# Patient Record
Sex: MALE | Race: WHITE | Employment: OTHER | URBAN - NONMETROPOLITAN AREA
[De-identification: names, ages, dates, MRNs, and addresses within clinical notes are randomized per-mention and may not be internally consistent; named-entity substitution may affect disease eponyms.]

---

## 2017-05-09 ENCOUNTER — OFFICE VISIT (OUTPATIENT)
Dept: FAMILY MEDICINE | Facility: CLINIC | Age: 57
End: 2017-05-09
Payer: COMMERCIAL

## 2017-05-09 VITALS
SYSTOLIC BLOOD PRESSURE: 160 MMHG | DIASTOLIC BLOOD PRESSURE: 108 MMHG | HEIGHT: 72 IN | WEIGHT: 217 LBS | RESPIRATION RATE: 18 BRPM | HEART RATE: 78 BPM | BODY MASS INDEX: 29.39 KG/M2 | TEMPERATURE: 97.6 F

## 2017-05-09 DIAGNOSIS — Z72.0 TOBACCO ABUSE: ICD-10-CM

## 2017-05-09 DIAGNOSIS — R53.83 FATIGUE, UNSPECIFIED TYPE: ICD-10-CM

## 2017-05-09 DIAGNOSIS — I10 BENIGN ESSENTIAL HYPERTENSION: Primary | ICD-10-CM

## 2017-05-09 DIAGNOSIS — E66.3 OVERWEIGHT (BMI 25.0-29.9): ICD-10-CM

## 2017-05-09 PROCEDURE — 99214 OFFICE O/P EST MOD 30 MIN: CPT | Performed by: FAMILY MEDICINE

## 2017-05-09 RX ORDER — LISINOPRIL 10 MG/1
10 TABLET ORAL DAILY
Qty: 90 TABLET | Refills: 0 | Status: SHIPPED | OUTPATIENT
Start: 2017-05-09

## 2017-05-09 NOTE — PATIENT INSTRUCTIONS
"  Free Testosterone  Does this test have other names?  Free T-index  What is this test?  This test measures your total testosterone and the amount of unattached, or \"free,\" testosterone in your blood.  Men and women both make testosterone, a hormone that helps children develop into adult males and females. Most of the testosterone in your blood attaches to two proteins: albumin and sex hormone binding globulin, or SHBG. Some testosterone is unattached to proteins, or free.  It can be important to measure a person's level of free testosterone because this hormone is responsible for sexual traits. When a boy reaches puberty, his body begins to make more free testosterone. But as he ages, his testosterone levels can fall and cause health problems.  Both men and women can have other health problems because of low or high levels of free testosterone. Women with high levels of free testosterone may have polycystic ovary syndrome (PCOS), a condition marked by infertility, lack of menstruation, acne, and excessive hair growth, especially on the face.  Men with low levels of free testosterone can lose their sex drive, suffer bone loss, or become infertile.   Why do I need this test?  You may have this test to find out whether a low sex drive is caused by a low level of free testosterone. In recent years, health care providers have used testosterone therapy to treat both men and women with low sex drives.  The test is also ordered for men with andropause, or late-onset hypogonadism, a condition caused by decreased testosterone. Men with this condition may have:    Anemia    Depression    Decreased bone density    Lack of energy or fatigue    Loss of muscle mass    Poor concentration    Erectile dysfunction or inability to have an orgasm    Infertility  Men with HIV/AIDS may also have low testosterone levels.   Signs and symptoms of low testosterone in women include:    Irregular or nonexistent menstruation    Excessive hair, " especially on the face    Blood sugar imbalance    Infertility    Thinning hair  If you are a man and this test reveals your free testosterone is lower than normal, your health care provider may prescribe testosterone therapy. The FDA has not approved any testosterone drugs for women.   What other tests might I have along with this test?  Men may have other tests, including:    Luteinizing hormone (LH), follicle-stimulating hormone (FSH), thyroid-stimulating hormone (TSH), and prolactin. These are all hormones produced by the pituitary gland.    Sperm analysis. This test counts the number of live sperm in the liquid that a man ejaculates. This test is often used to look for an infertility problem.    Testicular biopsy. This is a tissue sample from the testes.  Women may have other tests, including:    Androstenedione, total testosterone, and dehydroepiandrosterone sulfat, or DHEA-S?    LH and FSH, TSH, and prolactin. These are all hormones produced by the pituitary gland.    Partial 21-hydroxylase deficiency evaluation. High-risk ethnic groups include Ashkenazi Jews.   What do my test results mean?  Many things may affect your lab test results. These include the method each lab uses to do the test. Even if your test results are different from the normal value, you may not have a problem. To learn what the results mean for you, talk with your health care provider.  Results of this test are given in picograms per milliliter (pg/mL). Your level of free testosterone is normal if it is 0.3 to 2 pg/mL, or 0.1 to 0.3 percent of your total testosterone levels.   How is this test done?  The test requires a blood sample, which is drawn through a needle from a vein in your arm.  Does this test pose any risks?  Taking a blood sample with a needle carries risks that include bleeding, infection, bruising, or feeling dizzy. When the needle pricks your arm, you may feel a slight stinging sensation or pain. Afterward, the site may  be slightly sore.   What might affect my test results?  Drinking excessive amounts of alcohol can affect men's hormone levels. Conditions including obesity and diabetes can also affect men's testosterone levels.  For women, having certain health conditions, such as PCOS, can increase free testosterone.   How do I get ready for this test?  You don't need to prepare for this test. But be sure your doctor knows about all medicines, herbs, vitamins, and supplements you are taking. This includes medicines that don't need a prescription and any illicit drugs you may use.     8752-3228 [employer]. [address] [place] [address]. All rights reserved. This information is not intended as a substitute for professional medical care. Always follow your healthcare professional's instructions.        Controlling High Blood Pressure  High blood pressure (hypertension) is called the silent killer. This is because many people who have it don t know it. High blood pressure is 140/90 or higher. Know your blood pressure and remember to check it regularly. Doing so can save your life. Here are some things you can do to help control your blood pressure.    Choose heart-healthy foods    Select low-salt, low-fat foods.    Limit canned, dried, cured, packaged, and fast foods. These can contain a lot of salt.    Eat 8 to 10 servings of  fruits and vegetables every day.    Choose lean meats, fish, or chicken.    Eat whole-grain pasta, brown rice, and beans.    Eat 2 to 3 servings of low-fat or fat-free dairy products    Ask your doctor about the DASH eating plan. This plan helps reduce blood pressure.  Maintain a healthy weight    Ask your health care provider how many calories to eat a day. Then stick to that number.    Ask your health care provider what weight range is healthiest for you. If you are overweight, a weight loss of only 3% to 5% of your body weight can help lower blood pressure.    Limit snacks and  sweets.    Get regular exercise.  Get up and get active    Choose activities you enjoy. Find ones you can do with friends or family.    Park farther away from building entrances.    Use stairs instead of the elevator.    When you can, walk or bike instead of driving.    Harvey leaves, garden, or do household repairs.    Be active at a moderate to vigorous level of physical activity for at least 40 minutes for a minimum of 3 to 4 days a week.   Manage stress    Make time to relax and enjoy life. Find time to laugh.    Visit with family and friends, and keep up with hobbies.  Limit alcohol and quit smoking    Men should have no more than 2 drinks per day.    Women should have no more than 1 drink per day.    Talk with your health care provider about quitting smoking. Smoking increases your risk for heart disease and stroke. Ask about local or community programs that can help.  Medications  If lifestyle changes aren t enough, your health care provider may prescribe high blood pressure medicine. Take all medications as prescribed.     7883-7571 [employer]. [address] [place] [address]. All rights reserved. This information is not intended as a substitute for professional medical care. Always follow your healthcare professional's instructions.        Lisinopril Oral tablet  What is this medicine?  LISINOPRIL (lyse IN oh pril) is an ACE inhibitor. This medicine is used to treat high blood pressure and heart failure. It is also used to protect the heart immediately after a heart attack.  This medicine may be used for other purposes; ask your health care provider or pharmacist if you have questions.  What should I tell my health care provider before I take this medicine?  They need to know if you have any of these conditions:    diabetes    heart or blood vessel disease    immune system disease like lupus or scleroderma    kidney disease    low blood pressure    previous swelling of the tongue,  face, or lips with difficulty breathing, difficulty swallowing, hoarseness, or tightening of the throat    an unusual or allergic reaction to lisinopril, other ACE inhibitors, insect venom, foods, dyes, or preservatives    pregnant or trying to get pregnant    breast-feeding  How should I use this medicine?  Take this medicine by mouth with a glass of water. Follow the directions on your prescription label. You may take this medicine with or without food. Take your medicine at regular intervals. Do not stop taking this medicine except on the advice of your doctor or health care professional.  Talk to your pediatrician regarding the use of this medicine in children. Special care may be needed. While this drug may be prescribed for children as young as 6 years of age for selected conditions, precautions do apply.  Overdosage: If you think you have taken too much of this medicine contact a poison control center or emergency room at once.  NOTE: This medicine is only for you. Do not share this medicine with others.  What if I miss a dose?  If you miss a dose, take it as soon as you can. If it is almost time for your next dose, take only that dose. Do not take double or extra doses.  What may interact with this medicine?    diuretics    lithium    NSAIDs, medicines for pain and inflammation, like ibuprofen or naproxen    over-the-counter herbal supplements like hawthorn    potassium salts or potassium supplements    salt substitutes  This list may not describe all possible interactions. Give your health care provider a list of all the medicines, herbs, non-prescription drugs, or dietary supplements you use. Also tell them if you smoke, drink alcohol, or use illegal drugs. Some items may interact with your medicine.  What should I watch for while using this medicine?  Visit your doctor or health care professional for regular check ups. Check your blood pressure as directed. Ask your doctor what your blood pressure should  be, and when you should contact him or her. Call your doctor or health care professional if you notice an irregular or fast heart beat.  Women should inform their doctor if they wish to become pregnant or think they might be pregnant. There is a potential for serious side effects to an unborn child. Talk to your health care professional or pharmacist for more information.  Check with your doctor or health care professional if you get an attack of severe diarrhea, nausea and vomiting, or if you sweat a lot. The loss of too much body fluid can make it dangerous for you to take this medicine.  You may get drowsy or dizzy. Do not drive, use machinery, or do anything that needs mental alertness until you know how this drug affects you. Do not stand or sit up quickly, especially if you are an older patient. This reduces the risk of dizzy or fainting spells. Alcohol can make you more drowsy and dizzy. Avoid alcoholic drinks.  Avoid salt substitutes unless you are told otherwise by your doctor or health care professional.  Do not treat yourself for coughs, colds, or pain while you are taking this medicine without asking your doctor or health care professional for advice. Some ingredients may increase your blood pressure.  What side effects may I notice from receiving this medicine?  Side effects that you should report to your doctor or health care professional as soon as possible:    abdominal pain with or without nausea or vomiting    allergic reactions like skin rash or hives, swelling of the hands, feet, face, lips, throat, or tongue    dark urine    difficulty breathing    dizzy, lightheaded or fainting spell    fever or sore throat    irregular heart beat, chest pain    pain or difficulty passing urine    redness, blistering, peeling or loosening of the skin, including inside the mouth    unusually weak    yellowing of the eyes or skin  Side effects that usually do not require medical attention (report to your doctor  or health care professional if they continue or are bothersome):    change in taste    cough    decreased sexual function or desire    headache    sun sensitivity    tiredness  This list may not describe all possible side effects. Call your doctor for medical advice about side effects. You may report side effects to FDA at 1-457-XJO-0495.  Where should I keep my medicine?  Keep out of the reach of children.  Store at room temperature between 15 and 30 degrees C (59 and 86 degrees F). Protect from moisture. Keep container tightly closed. Throw away any unused medicine after the expiration date.  NOTE:This sheet is a summary. It may not cover all possible information. If you have questions about this medicine, talk to your doctor, pharmacist, or health care provider. Copyright  2016 [employer]        How to Quit Smoking  Smoking is one of the hardest habits to break. About half of all people who have ever smoked have been able to quit. Most people who still smoke want to quit. Here are some of the best ways to stop smoking.    Keep trying  It takes most smokers about eight tries before they can quit entirely. It s important not to give up.  Go cold turkey  Most former smokers quit cold turkey (all at once). Trying to cut back gradually doesn't seem to work as well, perhaps because it continues the smoking habit. Also, it is possible to inhale more while smoking fewer cigarettes. This results in the same amount of nicotine in your body!  Get support  Support programs can be a big help, especially for heavy smokers. These groups offer lectures, ways to change behavior, and peer support. Here are some ways to find a support program:    Free national quitline: 800-QUIT-NOW (578-283-4990).    Hospital quit-smoking programs.    American Lung Association: (493.885.9137).    American Cancer Society (716-033-3227).  Support at home is important too. Nonsmokers can offer praise and encouragement. If the smoker in your life  finds it hard to quit, encourage them to keep trying!  Over-the-counter medicines  Nicotine replacement therapy may make quitting easier. Certain aids, such as the nicotine patch, gum, and lozenges, are available without a prescription. It is best to use these under a doctor s care, though. The skin patch provides a steady supply of nicotine. Nicotine gum and lozenges give temporary bursts of low levels of nicotine. Both methods reduce the craving for cigarettes. Warning: If you have nausea, vomiting, dizziness, weakness, or a fast heartbeat, stop using these products and see your doctor.  Prescription medicines  After reviewing your smoking patterns and prior attempts to quit, your doctor may offer a prescription medicine such as bupropion, varenicline, a nicotine inhaler, or nasal spray. Each has advantages and side effects. Your doctor can review these with you.  Health benefits of quitting  The benefits of quitting start right away and keep improving the longer you go without smoking. These benefits occur at any age.  So whether you are 17 or 70, quitting is a good decision. Some of the benefits include:    20 minutes: Blood pressure and pulse return to normal.    8 hours: Oxygen levels return to normal.    2 days: Ability to smell and taste begin to improve as damaged nerves regrow.    2 to 3 weeks: Circulation and lung function improve.    1 to 9 months: Coughing, congestion, and shortness of breath decrease; tiredness decreases.    1 year: Risk of heart attack decreases by half.    5 years: Risk of lung cancer decreases by half; risk of stroke becomes the same as a nonsmoker s.  For more on how to quit smoking, try these online resources:     Smokefree.gov http://smokefree.gov/    Clearing the Air  booklet from the [employer] http://smokefree.gov/sites/default/files/pdf/clearing-the-air-accessible.pdf    9369-2900 [employer]. [address] [place] [address], [place] [address]. All rights  reserved. This information is not intended as a substitute for professional medical care. Always follow your healthcare professional's instructions.

## 2017-05-09 NOTE — PROGRESS NOTES
SUBJECTIVE:                                                    Jed Jiménez is a 56 year old male who presents to clinic today for the following health issues:      Wants Testosterone testing      Duration: about a year or so     Description (location/character/radiation): low energy,     Intensity:  moderate    Accompanying signs and symptoms: had a friend had got a cream that he puts on his arm for low testosterone and want the same thing     History (similar episodes/previous evaluation): None    Precipitating or alleviating factors: never had any other work up for low energy done     Therapies tried and outcome: None    Appetite is good, weight gain 30-40 Ibs during last 2 years       Past medical history significant for hypertension, hyperlipidemia, overweight and tobacco abuse. He smokes about one pack cigarette per day, takes 3-4 diet soft drinks daily, denies taking added salt or excessive caffeine.       Problem list and histories reviewed & adjusted, as indicated.  Additional history: as documented    There is no problem list on file for this patient.    History reviewed. No pertinent surgical history.    Social History   Substance Use Topics     Smoking status: Current Every Day Smoker     Packs/day: 1.00     Types: Cigarettes     Smokeless tobacco: Never Used      Comment: 1/2 PPD     Alcohol use Yes     History reviewed. No pertinent family history.      Current Outpatient Prescriptions   Medication Sig Dispense Refill     nitroglycerin (NITROSTAT) 0.4 MG SL tablet Place 0.4 mg under the tongue every 5 minutes as needed for chest pain Reported on 5/9/2017       Allergies   Allergen Reactions     Nkda [No Known Drug Allergies]      No lab results found.   BP Readings from Last 3 Encounters:   05/09/17 (!) 160/108   01/10/15 132/80   11/11/14 (!) 178/100    Wt Readings from Last 3 Encounters:   05/09/17 217 lb (98.4 kg)   01/10/15 208 lb (94.3 kg)   11/11/14 217 lb 3.2 oz (98.5 kg)                   Labs reviewed in EPIC    Reviewed and updated as needed this visit by clinical staff       Reviewed and updated as needed this visit by Provider         ROS:  Constitutional, HEENT, cardiovascular, pulmonary, gi and gu systems are negative, except as otherwise noted.    OBJECTIVE:                                                    BP (!) 160/108 (Cuff Size: Adult Regular)  Pulse 78  Temp 97.6  F (36.4  C) (Tympanic)  Resp 18  Ht 6' (1.829 m)  Wt 217 lb (98.4 kg)  BMI 29.43 kg/m2  Body mass index is 29.43 kg/(m^2).  GENERAL: alert, no distress and over weight  EYES: Eyes grossly normal to inspection, PERRL and conjunctivae and sclerae normal  NECK: no adenopathy, no asymmetry, masses, or scars and thyroid normal to palpation  RESP: lungs clear to auscultation - no rales, rhonchi or wheezes  CV: regular rate and rhythm, normal S1 S2, no S3 or S4, no murmur, click or rub, no peripheral edema and peripheral pulses strong  ABDOMEN: soft, nontender, no hepatosplenomegaly, no masses and bowel sounds normal  MS: no gross musculoskeletal defects noted, no edema  NEURO: Normal strength and tone, mentation intact and speech normal  PSYCH: mentation appears normal, affect normal/bright  LYMPH: no cervical, supraclavicular, axillary, or inguinal adenopathy     ASSESSMENT/PLAN:                                                        1. Benign essential hypertension  - Patient is known to have hypertension, was prescribed metoprolol and Effient which is not taking, he states that he don't believe taking too many medications. Health hazards associated with hypertension explained in detail including stroke, heart attack and renal failure  - Patient agreed to take lisinopril, suggested to monitor blood pressure regularly  - Healthy lifestyle modifications stressed including regular exercise, balanced diet, weight loss and limiting salt/caffeine/pop intake  - CBC, CMP, TSH and lipid panel ordered  - Patient not interested in  "taking statin therapy for now  - lisinopril (PRINIVIL/ZESTRIL) 10 MG tablet; Take 1 tablet (10 mg) by mouth daily  Dispense: 90 tablet; Refill: 0  - Lipid panel; Future      2. Fatigue, unspecified type  - Symptoms are likely multifactorial  - Explained that symptoms are unlikely related to testosterone deficiency, however we'll perform fasting levels tomorrow  - Explained specific criteria for testosterone replacement and associated side effects including hematological, cardiac, prostate hyperplasia and sleep apnea  - Suggested to stop smoking, regular exercise, weight loss and taking medication for blood pressure  - CBC with platelets  - **Comprehensive metabolic panel FUTURE anytime; Future  - TSH  - Testosterone Free and Total; Future      3. Overweight (BMI 25.0-29.9)  - Healthy lifestyle modifications stressed including regular exercise, balanced diet, weight loss and limiting salt/caffeine/pop intake        4. Tobacco abuse  - Smoking cessation counseling provided  - Patient doesn't seems to be interested in smoking cessation  - Written information provided as below        MEDICATIONS:   Orders Placed This Encounter   Medications     lisinopril (PRINIVIL/ZESTRIL) 10 MG tablet     Sig: Take 1 tablet (10 mg) by mouth daily     Dispense:  90 tablet     Refill:  0     Patient Instructions       Free Testosterone  Does this test have other names?  Free T-index  What is this test?  This test measures your total testosterone and the amount of unattached, or \"free,\" testosterone in your blood.  Men and women both make testosterone, a hormone that helps children develop into adult males and females. Most of the testosterone in your blood attaches to two proteins: albumin and sex hormone binding globulin, or SHBG. Some testosterone is unattached to proteins, or free.  It can be important to measure a person's level of free testosterone because this hormone is responsible for sexual traits. When a boy reaches puberty, " his body begins to make more free testosterone. But as he ages, his testosterone levels can fall and cause health problems.  Both men and women can have other health problems because of low or high levels of free testosterone. Women with high levels of free testosterone may have polycystic ovary syndrome (PCOS), a condition marked by infertility, lack of menstruation, acne, and excessive hair growth, especially on the face.  Men with low levels of free testosterone can lose their sex drive, suffer bone loss, or become infertile.   Why do I need this test?  You may have this test to find out whether a low sex drive is caused by a low level of free testosterone. In recent years, health care providers have used testosterone therapy to treat both men and women with low sex drives.  The test is also ordered for men with andropause, or late-onset hypogonadism, a condition caused by decreased testosterone. Men with this condition may have:    Anemia    Depression    Decreased bone density    Lack of energy or fatigue    Loss of muscle mass    Poor concentration    Erectile dysfunction or inability to have an orgasm    Infertility  Men with HIV/AIDS may also have low testosterone levels.   Signs and symptoms of low testosterone in women include:    Irregular or nonexistent menstruation    Excessive hair, especially on the face    Blood sugar imbalance    Infertility    Thinning hair  If you are a man and this test reveals your free testosterone is lower than normal, your health care provider may prescribe testosterone therapy. The FDA has not approved any testosterone drugs for women.   What other tests might I have along with this test?  Men may have other tests, including:    Luteinizing hormone (LH), follicle-stimulating hormone (FSH), thyroid-stimulating hormone (TSH), and prolactin. These are all hormones produced by the pituitary gland.    Sperm analysis. This test counts the number of live sperm in the liquid that a  man ejaculates. This test is often used to look for an infertility problem.    Testicular biopsy. This is a tissue sample from the testes.  Women may have other tests, including:    Androstenedione, total testosterone, and dehydroepiandrosterone sulfat, or DHEA-S?    LH and FSH, TSH, and prolactin. These are all hormones produced by the pituitary gland.    Partial 21-hydroxylase deficiency evaluation. High-risk ethnic groups include Ashkenazi Jews.   What do my test results mean?  Many things may affect your lab test results. These include the method each lab uses to do the test. Even if your test results are different from the normal value, you may not have a problem. To learn what the results mean for you, talk with your health care provider.  Results of this test are given in picograms per milliliter (pg/mL). Your level of free testosterone is normal if it is 0.3 to 2 pg/mL, or 0.1 to 0.3 percent of your total testosterone levels.   How is this test done?  The test requires a blood sample, which is drawn through a needle from a vein in your arm.  Does this test pose any risks?  Taking a blood sample with a needle carries risks that include bleeding, infection, bruising, or feeling dizzy. When the needle pricks your arm, you may feel a slight stinging sensation or pain. Afterward, the site may be slightly sore.   What might affect my test results?  Drinking excessive amounts of alcohol can affect men's hormone levels. Conditions including obesity and diabetes can also affect men's testosterone levels.  For women, having certain health conditions, such as PCOS, can increase free testosterone.   How do I get ready for this test?  You don't need to prepare for this test. But be sure your doctor knows about all medicines, herbs, vitamins, and supplements you are taking. This includes medicines that don't need a prescription and any illicit drugs you may use.     3818-3609 [employer]. [address], [place] [address]. All rights reserved. This information is not intended as a substitute for professional medical care. Always follow your healthcare professional's instructions.        Controlling High Blood Pressure  High blood pressure (hypertension) is called the silent killer. This is because many people who have it don t know it. High blood pressure is 140/90 or higher. Know your blood pressure and remember to check it regularly. Doing so can save your life. Here are some things you can do to help control your blood pressure.    Choose heart-healthy foods    Select low-salt, low-fat foods.    Limit canned, dried, cured, packaged, and fast foods. These can contain a lot of salt.    Eat 8 to 10 servings of  fruits and vegetables every day.    Choose lean meats, fish, or chicken.    Eat whole-grain pasta, brown rice, and beans.    Eat 2 to 3 servings of low-fat or fat-free dairy products    Ask your doctor about the DASH eating plan. This plan helps reduce blood pressure.  Maintain a healthy weight    Ask your health care provider how many calories to eat a day. Then stick to that number.    Ask your health care provider what weight range is healthiest for you. If you are overweight, a weight loss of only 3% to 5% of your body weight can help lower blood pressure.    Limit snacks and sweets.    Get regular exercise.  Get up and get active    Choose activities you enjoy. Find ones you can do with friends or family.    Park farther away from building entrances.    Use stairs instead of the elevator.    When you can, walk or bike instead of driving.    [place] leaves, garden, or do household repairs.    Be active at a moderate to vigorous level of physical activity for at least 40 minutes for a minimum of 3 to 4 days a week.   Manage stress    Make time to relax and enjoy life. Find time to laugh.    Visit with family and friends, and keep up with hobbies.  Limit alcohol and quit smoking    Men should have no  more than 2 drinks per day.    Women should have no more than 1 drink per day.    Talk with your health care provider about quitting smoking. Smoking increases your risk for heart disease and stroke. Ask about local or community programs that can help.  Medications  If lifestyle changes aren t enough, your health care provider may prescribe high blood pressure medicine. Take all medications as prescribed.     5222-2744 [employer]. [address] [place] [address]. All rights reserved. This information is not intended as a substitute for professional medical care. Always follow your healthcare professional's instructions.        Lisinopril Oral tablet  What is this medicine?  LISINOPRIL (lyse IN oh pril) is an ACE inhibitor. This medicine is used to treat high blood pressure and heart failure. It is also used to protect the heart immediately after a heart attack.  This medicine may be used for other purposes; ask your health care provider or pharmacist if you have questions.  What should I tell my health care provider before I take this medicine?  They need to know if you have any of these conditions:    diabetes    heart or blood vessel disease    immune system disease like lupus or scleroderma    kidney disease    low blood pressure    previous swelling of the tongue, face, or lips with difficulty breathing, difficulty swallowing, hoarseness, or tightening of the throat    an unusual or allergic reaction to lisinopril, other ACE inhibitors, insect venom, foods, dyes, or preservatives    pregnant or trying to get pregnant    breast-feeding  How should I use this medicine?  Take this medicine by mouth with a glass of water. Follow the directions on your prescription label. You may take this medicine with or without food. Take your medicine at regular intervals. Do not stop taking this medicine except on the advice of your doctor or health care professional.  Talk to your pediatrician regarding  the use of this medicine in children. Special care may be needed. While this drug may be prescribed for children as young as 6 years of age for selected conditions, precautions do apply.  Overdosage: If you think you have taken too much of this medicine contact a poison control center or emergency room at once.  NOTE: This medicine is only for you. Do not share this medicine with others.  What if I miss a dose?  If you miss a dose, take it as soon as you can. If it is almost time for your next dose, take only that dose. Do not take double or extra doses.  What may interact with this medicine?    diuretics    lithium    NSAIDs, medicines for pain and inflammation, like ibuprofen or naproxen    over-the-counter herbal supplements like hawthorn    potassium salts or potassium supplements    salt substitutes  This list may not describe all possible interactions. Give your health care provider a list of all the medicines, herbs, non-prescription drugs, or dietary supplements you use. Also tell them if you smoke, drink alcohol, or use illegal drugs. Some items may interact with your medicine.  What should I watch for while using this medicine?  Visit your doctor or health care professional for regular check ups. Check your blood pressure as directed. Ask your doctor what your blood pressure should be, and when you should contact him or her. Call your doctor or health care professional if you notice an irregular or fast heart beat.  Women should inform their doctor if they wish to become pregnant or think they might be pregnant. There is a potential for serious side effects to an unborn child. Talk to your health care professional or pharmacist for more information.  Check with your doctor or health care professional if you get an attack of severe diarrhea, nausea and vomiting, or if you sweat a lot. The loss of too much body fluid can make it dangerous for you to take this medicine.  You may get drowsy or dizzy. Do not  drive, use machinery, or do anything that needs mental alertness until you know how this drug affects you. Do not stand or sit up quickly, especially if you are an older patient. This reduces the risk of dizzy or fainting spells. Alcohol can make you more drowsy and dizzy. Avoid alcoholic drinks.  Avoid salt substitutes unless you are told otherwise by your doctor or health care professional.  Do not treat yourself for coughs, colds, or pain while you are taking this medicine without asking your doctor or health care professional for advice. Some ingredients may increase your blood pressure.  What side effects may I notice from receiving this medicine?  Side effects that you should report to your doctor or health care professional as soon as possible:    abdominal pain with or without nausea or vomiting    allergic reactions like skin rash or hives, swelling of the hands, feet, face, lips, throat, or tongue    dark urine    difficulty breathing    dizzy, lightheaded or fainting spell    fever or sore throat    irregular heart beat, chest pain    pain or difficulty passing urine    redness, blistering, peeling or loosening of the skin, including inside the mouth    unusually weak    yellowing of the eyes or skin  Side effects that usually do not require medical attention (report to your doctor or health care professional if they continue or are bothersome):    change in taste    cough    decreased sexual function or desire    headache    sun sensitivity    tiredness  This list may not describe all possible side effects. Call your doctor for medical advice about side effects. You may report side effects to FDA at 4-521-FDA-3798.  Where should I keep my medicine?  Keep out of the reach of children.  Store at room temperature between 15 and 30 degrees C (59 and 86 degrees F). Protect from moisture. Keep container tightly closed. Throw away any unused medicine after the expiration date.  NOTE:This sheet is a summary. It  may not cover all possible information. If you have questions about this medicine, talk to your doctor, pharmacist, or health care provider. Copyright  2016 [employer]        How to Quit Smoking  Smoking is one of the hardest habits to break. About half of all people who have ever smoked have been able to quit. Most people who still smoke want to quit. Here are some of the best ways to stop smoking.    Keep trying  It takes most smokers about eight tries before they can quit entirely. It s important not to give up.  Go cold turkey  Most former smokers quit cold turkey (all at once). Trying to cut back gradually doesn't seem to work as well, perhaps because it continues the smoking habit. Also, it is possible to inhale more while smoking fewer cigarettes. This results in the same amount of nicotine in your body!  Get support  Support programs can be a big help, especially for heavy smokers. These groups offer lectures, ways to change behavior, and peer support. Here are some ways to find a support program:    Free national quitline: 800-QUIT-NOW (666-727-9287).    [facility] quit-smoking programs.    American Lung Association: (534.130.2984).    American Cancer Society (306-663-0327).  Support at home is important too. Nonsmokers can offer praise and encouragement. If the smoker in your life finds it hard to quit, encourage them to keep trying!  Over-the-counter medicines  Nicotine replacement therapy may make quitting easier. Certain aids, such as the nicotine patch, gum, and lozenges, are available without a prescription. It is best to use these under a doctor s care, though. The skin patch provides a steady supply of nicotine. Nicotine gum and lozenges give temporary bursts of low levels of nicotine. Both methods reduce the craving for cigarettes. Warning: If you have nausea, vomiting, dizziness, weakness, or a fast heartbeat, stop using these products and see your doctor.  Prescription medicines  After  reviewing your smoking patterns and prior attempts to quit, your doctor may offer a prescription medicine such as bupropion, varenicline, a nicotine inhaler, or nasal spray. Each has advantages and side effects. Your doctor can review these with you.  Health benefits of quitting  The benefits of quitting start right away and keep improving the longer you go without smoking. These benefits occur at any age.  So whether you are 17 or 70, quitting is a good decision. Some of the benefits include:    20 minutes: Blood pressure and pulse return to normal.    8 hours: Oxygen levels return to normal.    2 days: Ability to smell and taste begin to improve as damaged nerves regrow.    2 to 3 weeks: Circulation and lung function improve.    1 to 9 months: Coughing, congestion, and shortness of breath decrease; tiredness decreases.    1 year: Risk of heart attack decreases by half.    5 years: Risk of lung cancer decreases by half; risk of stroke becomes the same as a nonsmoker s.  For more on how to quit smoking, try these online resources:     Smokefree.gov http://smokefree.gov/    Clearing the Air  booklet from the National [employer] http://smokefree.gov/sites/default/files/pdf/clearing-the-air-accessible.pdf    3425-8568 [employer]. [address] [place] [address], [place] [id number]. All rights reserved. This information is not intended as a substitute for professional medical care. Always follow your healthcare professional's instructions.            Nnamdi Hilario MD  [facility]

## 2017-05-09 NOTE — MR AVS SNAPSHOT
"              After Visit Summary   5/9/2017    Jed Jiménez    MRN: [id number]           Patient Information     Date Of Birth          1960        Visit Information        Provider Department      5/9/2017 11:00 AM Nnamdi Hilario MD [facility]        Today's Diagnoses     Benign essential hypertension    -  1    Fatigue, unspecified type        Overweight (BMI 25.0-29.9)        Tobacco abuse          Care Instructions      Free Testosterone  Does this test have other names?  Free T-index  What is this test?  This test measures your total testosterone and the amount of unattached, or \"free,\" testosterone in your blood.  Men and women both make testosterone, a hormone that helps children develop into adult males and females. Most of the testosterone in your blood attaches to two proteins: albumin and sex hormone binding globulin, or SHBG. Some testosterone is unattached to proteins, or free.  It can be important to measure a person's level of free testosterone because this hormone is responsible for sexual traits. When a boy reaches puberty, his body begins to make more free testosterone. But as he ages, his testosterone levels can fall and cause health problems.  Both men and women can have other health problems because of low or high levels of free testosterone. Women with high levels of free testosterone may have polycystic ovary syndrome (PCOS), a condition marked by infertility, lack of menstruation, acne, and excessive hair growth, especially on the face.  Men with low levels of free testosterone can lose their sex drive, suffer bone loss, or become infertile.   Why do I need this test?  You may have this test to find out whether a low sex drive is caused by a low level of free testosterone. In recent years, health care providers have used testosterone therapy to treat both men and women with low sex drives.  The test is also ordered for men with andropause, or late-onset " hypogonadism, a condition caused by decreased testosterone. Men with this condition may have:    Anemia    Depression    Decreased bone density    Lack of energy or fatigue    Loss of muscle mass    Poor concentration    Erectile dysfunction or inability to have an orgasm    Infertility  Men with HIV/AIDS may also have low testosterone levels.   Signs and symptoms of low testosterone in women include:    Irregular or nonexistent menstruation    Excessive hair, especially on the face    Blood sugar imbalance    Infertility    Thinning hair  If you are a man and this test reveals your free testosterone is lower than normal, your health care provider may prescribe testosterone therapy. The FDA has not approved any testosterone drugs for women.   What other tests might I have along with this test?  Men may have other tests, including:    Luteinizing hormone (LH), follicle-stimulating hormone (FSH), thyroid-stimulating hormone (TSH), and prolactin. These are all hormones produced by the pituitary gland.    Sperm analysis. This test counts the number of live sperm in the liquid that a man ejaculates. This test is often used to look for an infertility problem.    Testicular biopsy. This is a tissue sample from the testes.  Women may have other tests, including:    Androstenedione, total testosterone, and dehydroepiandrosterone sulfat, or DHEA-S?    LH and FSH, TSH, and prolactin. These are all hormones produced by the pituitary gland.    Partial 21-hydroxylase deficiency evaluation. High-risk ethnic groups include Ashkenazi Jews.   What do my test results mean?  Many things may affect your lab test results. These include the method each lab uses to do the test. Even if your test results are different from the normal value, you may not have a problem. To learn what the results mean for you, talk with your health care provider.  Results of this test are given in picograms per milliliter (pg/mL). Your level of free  testosterone is normal if it is 0.3 to 2 pg/mL, or 0.1 to 0.3 percent of your total testosterone levels.   How is this test done?  The test requires a blood sample, which is drawn through a needle from a vein in your arm.  Does this test pose any risks?  Taking a blood sample with a needle carries risks that include bleeding, infection, bruising, or feeling dizzy. When the needle pricks your arm, you may feel a slight stinging sensation or pain. Afterward, the site may be slightly sore.   What might affect my test results?  Drinking excessive amounts of alcohol can affect men's hormone levels. Conditions including obesity and diabetes can also affect men's testosterone levels.  For women, having certain health conditions, such as PCOS, can increase free testosterone.   How do I get ready for this test?  You don't need to prepare for this test. But be sure your doctor knows about all medicines, herbs, vitamins, and supplements you are taking. This includes medicines that don't need a prescription and any illicit drugs you may use.     4316-5106 [employer]. [address] [place] [address]. All rights reserved. This information is not intended as a substitute for professional medical care. Always follow your healthcare professional's instructions.        Controlling High Blood Pressure  High blood pressure (hypertension) is called the silent killer. This is because many people who have it don t know it. High blood pressure is 140/90 or higher. Know your blood pressure and remember to check it regularly. Doing so can save your life. Here are some things you can do to help control your blood pressure.    Choose heart-healthy foods    Select low-salt, low-fat foods.    Limit canned, dried, cured, packaged, and fast foods. These can contain a lot of salt.    Eat 8 to 10 servings of  fruits and vegetables every day.    Choose lean meats, fish, or chicken.    Eat whole-grain pasta, brown rice, and  beans.    Eat 2 to 3 servings of low-fat or fat-free dairy products    Ask your doctor about the DASH eating plan. This plan helps reduce blood pressure.  Maintain a healthy weight    Ask your health care provider how many calories to eat a day. Then stick to that number.    Ask your health care provider what weight range is healthiest for you. If you are overweight, a weight loss of only 3% to 5% of your body weight can help lower blood pressure.    Limit snacks and sweets.    Get regular exercise.  Get up and get active    Choose activities you enjoy. Find ones you can do with friends or family.    Park farther away from building entrances.    Use stairs instead of the elevator.    When you can, walk or bike instead of driving.    [place] leaves, garden, or do household repairs.    Be active at a moderate to vigorous level of physical activity for at least 40 minutes for a minimum of 3 to 4 days a week.   Manage stress    Make time to relax and enjoy life. Find time to laugh.    Visit with family and friends, and keep up with hobbies.  Limit alcohol and quit smoking    Men should have no more than 2 drinks per day.    Women should have no more than 1 drink per day.    Talk with your health care provider about quitting smoking. Smoking increases your risk for heart disease and stroke. Ask about local or community programs that can help.  Medications  If lifestyle changes aren t enough, your health care provider may prescribe high blood pressure medicine. Take all medications as prescribed.     9100-2628 [employer]. [address] [place] [address], [place] [address]. All rights reserved. This information is not intended as a substitute for professional medical care. Always follow your healthcare professional's instructions.        Lisinopril Oral tablet  What is this medicine?  LISINOPRIL (lyse IN oh pril) is an ACE inhibitor. This medicine is used to treat high blood pressure and heart failure. It is also used  to protect the heart immediately after a heart attack.  This medicine may be used for other purposes; ask your health care provider or pharmacist if you have questions.  What should I tell my health care provider before I take this medicine?  They need to know if you have any of these conditions:    diabetes    heart or blood vessel disease    immune system disease like lupus or scleroderma    kidney disease    low blood pressure    previous swelling of the tongue, face, or lips with difficulty breathing, difficulty swallowing, hoarseness, or tightening of the throat    an unusual or allergic reaction to lisinopril, other ACE inhibitors, insect venom, foods, dyes, or preservatives    pregnant or trying to get pregnant    breast-feeding  How should I use this medicine?  Take this medicine by mouth with a glass of water. Follow the directions on your prescription label. You may take this medicine with or without food. Take your medicine at regular intervals. Do not stop taking this medicine except on the advice of your doctor or health care professional.  Talk to your pediatrician regarding the use of this medicine in children. Special care may be needed. While this drug may be prescribed for children as young as 6 years of age for selected conditions, precautions do apply.  Overdosage: If you think you have taken too much of this medicine contact a poison control center or emergency room at once.  NOTE: This medicine is only for you. Do not share this medicine with others.  What if I miss a dose?  If you miss a dose, take it as soon as you can. If it is almost time for your next dose, take only that dose. Do not take double or extra doses.  What may interact with this medicine?    diuretics    lithium    NSAIDs, medicines for pain and inflammation, like ibuprofen or naproxen    over-the-counter herbal supplements like hawthorn    potassium salts or potassium supplements    salt substitutes  This list may not describe  all possible interactions. Give your health care provider a list of all the medicines, herbs, non-prescription drugs, or dietary supplements you use. Also tell them if you smoke, drink alcohol, or use illegal drugs. Some items may interact with your medicine.  What should I watch for while using this medicine?  Visit your doctor or health care professional for regular check ups. Check your blood pressure as directed. Ask your doctor what your blood pressure should be, and when you should contact him or her. Call your doctor or health care professional if you notice an irregular or fast heart beat.  Women should inform their doctor if they wish to become pregnant or think they might be pregnant. There is a potential for serious side effects to an unborn child. Talk to your health care professional or pharmacist for more information.  Check with your doctor or health care professional if you get an attack of severe diarrhea, nausea and vomiting, or if you sweat a lot. The loss of too much body fluid can make it dangerous for you to take this medicine.  You may get drowsy or dizzy. Do not drive, use machinery, or do anything that needs mental alertness until you know how this drug affects you. Do not stand or sit up quickly, especially if you are an older patient. This reduces the risk of dizzy or fainting spells. Alcohol can make you more drowsy and dizzy. Avoid alcoholic drinks.  Avoid salt substitutes unless you are told otherwise by your doctor or health care professional.  Do not treat yourself for coughs, colds, or pain while you are taking this medicine without asking your doctor or health care professional for advice. Some ingredients may increase your blood pressure.  What side effects may I notice from receiving this medicine?  Side effects that you should report to your doctor or health care professional as soon as possible:    abdominal pain with or without nausea or vomiting    allergic reactions like skin  rash or hives, swelling of the hands, feet, face, lips, throat, or tongue    dark urine    difficulty breathing    dizzy, lightheaded or fainting spell    fever or sore throat    irregular heart beat, chest pain    pain or difficulty passing urine    redness, blistering, peeling or loosening of the skin, including inside the mouth    unusually weak    yellowing of the eyes or skin  Side effects that usually do not require medical attention (report to your doctor or health care professional if they continue or are bothersome):    change in taste    cough    decreased sexual function or desire    headache    sun sensitivity    tiredness  This list may not describe all possible side effects. Call your doctor for medical advice about side effects. You may report side effects to FDA at 2-469-HCB-8612.  Where should I keep my medicine?  Keep out of the reach of children.  Store at room temperature between 15 and 30 degrees C (59 and 86 degrees F). Protect from moisture. Keep container tightly closed. Throw away any unused medicine after the expiration date.  NOTE:This sheet is a summary. It may not cover all possible information. If you have questions about this medicine, talk to your doctor, pharmacist, or health care provider. Copyright  2016 [employer]        How to Quit Smoking  Smoking is one of the hardest habits to break. About half of all people who have ever smoked have been able to quit. Most people who still smoke want to quit. Here are some of the best ways to stop smoking.    Keep trying  It takes most smokers about eight tries before they can quit entirely. It s important not to give up.  Go cold turkey  Most former smokers quit cold turkey (all at once). Trying to cut back gradually doesn't seem to work as well, perhaps because it continues the smoking habit. Also, it is possible to inhale more while smoking fewer cigarettes. This results in the same amount of nicotine in your body!  Get support  Support  programs can be a big help, especially for heavy smokers. These groups offer lectures, ways to change behavior, and peer support. Here are some ways to find a support program:    Free national quitline: 800-QUIT-NOW (937-804-6597).    Hospital quit-smoking programs.    American Lung Association: (537.147.5508).    American Cancer Society (961-589-7933).  Support at home is important too. Nonsmokers can offer praise and encouragement. If the smoker in your life finds it hard to quit, encourage them to keep trying!  Over-the-counter medicines  Nicotine replacement therapy may make quitting easier. Certain aids, such as the nicotine patch, gum, and lozenges, are available without a prescription. It is best to use these under a doctor s care, though. The skin patch provides a steady supply of nicotine. Nicotine gum and lozenges give temporary bursts of low levels of nicotine. Both methods reduce the craving for cigarettes. Warning: If you have nausea, vomiting, dizziness, weakness, or a fast heartbeat, stop using these products and see your doctor.  Prescription medicines  After reviewing your smoking patterns and prior attempts to quit, your doctor may offer a prescription medicine such as bupropion, varenicline, a nicotine inhaler, or nasal spray. Each has advantages and side effects. Your doctor can review these with you.  Health benefits of quitting  The benefits of quitting start right away and keep improving the longer you go without smoking. These benefits occur at any age.  So whether you are 17 or 70, quitting is a good decision. Some of the benefits include:    20 minutes: Blood pressure and pulse return to normal.    8 hours: Oxygen levels return to normal.    2 days: Ability to smell and taste begin to improve as damaged nerves regrow.    2 to 3 weeks: Circulation and lung function improve.    1 to 9 months: Coughing, congestion, and shortness of breath decrease; tiredness decreases.    1 year: Risk of heart  attack decreases by half.    5 years: Risk of lung cancer decreases by half; risk of stroke becomes the same as a nonsmoker s.  For more on how to quit smoking, try these online resources:     Smokefree.gov http://smokefree.gov/    Clearing the Air  booklet from the National Cancer [place] http://smokefree.gov/sites/default/files/pdf/clearing-the-air-accessible.pdf    3310-5945 [employer]. [address] [place] [address]. All rights reserved. This information is not intended as a substitute for professional medical care. Always follow your healthcare professional's instructions.              Follow-ups after your visit        Your next 10 appointments already scheduled     May 12, 2017  8:30 AM CDT   [facility]    [address] [place] [id number]   [phone]           Patient must bring picture ID.  Patient should be prepared to give a urine specimen  Please do not eat 10-12 hours before your appointment if you are coming in fasting for labs on lipids, cholesterol, or glucose (sugar).  Pregnant women should follow their Care Team instructions. Water with medications is okay. Do not drink coffee or other fluids.   If you have concerns about taking  your medications, please ask at office or if scheduling via [employer], send a message by clicking on Secure Messaging, Message Your Care Team.              Future tests that were ordered for you today     Open Future Orders        Priority Expected Expires Ordered    Testosterone Free and Total Routine 5/10/2017 5/9/2018 5/9/2017    Lipid panel Routine 5/10/2017 5/9/2018 5/9/2017    **Comprehensive metabolic panel FUTURE anytime Routine 5/9/2017 5/9/2018 5/9/2017            Who to contact     If you have questions or need follow up information about today's clinic visit or your schedule please contact [facility] directly at [phone].  Normal or  "non-critical lab and imaging results will be communicated to you by MyChart, letter or phone within 4 business days after the clinic has received the results. If you do not hear from us within 7 days, please contact the clinic through [employer] or phone. If you have a critical or abnormal lab result, we will notify you by phone as soon as possible.  Submit refill requests through [employer] or call your pharmacy and they will forward the refill request to us. Please allow 3 business days for your refill to be completed.          Additional Information About Your Visit        [employer] Information     [employer] lets you send messages to your doctor, view your test results, renew your prescriptions, schedule appointments and more. To sign up, go to [email or web address] . Click on \"Log in\" on the left side of the screen, which will take you to the Welcome page. Then click on \"Sign up Now\" on the right side of the page.     You will be asked to enter the access code listed below, as well as some personal information. Please follow the directions to create your username and password.     Your access code is: [email or web address]  Expires: 2017 11:15 AM     Your access code will  in 90 days. If you need help or a new code, please call your [place] clinic or [phone].        Care EveryWhere ID     This is your Care EveryWhere ID. This could be used by other organizations to access your [facility] medical records  [id number]        Your Vitals Were     Pulse Temperature Respirations Height BMI (Body Mass Index)       78 97.6  F (36.4  C) (Tympanic) 18 6' (1.829 m) 29.43 kg/m2        Blood Pressure from Last 3 Encounters:   17 (!) 160/108   01/10/15 132/80   14 (!) 178/100    Weight from Last 3 Encounters:   17 217 lb (98.4 kg)   01/10/15 208 lb (94.3 kg)   14 217 lb 3.2 oz (98.5 kg)              We Performed the Following     CBC with platelets     TSH          Today's Medication Changes        "   These changes are accurate as of: 5/9/17 11:20 AM.  If you have any questions, ask your nurse or doctor.               Start taking these medicines.        Dose/Directions    lisinopril 10 MG tablet   Commonly known as:  PRINIVIL/ZESTRIL   Used for:  Benign essential hypertension   Started by:  Nnamdi Hilario MD        Dose:  10 mg   Take 1 tablet (10 mg) by mouth daily   Quantity:  90 tablet   Refills:  0         Stop taking these medicines if you haven't already. Please contact your care team if you have questions.     atorvastatin 80 MG tablet   Commonly known as:  LIPITOR   Stopped by:  Nnamdi Hilario MD           EFFIENT 10 MG Tabs tablet   Generic drug:  prasugrel   Stopped by:  Nnamdi Hilario MD           metoprolol 50 MG tablet   Commonly known as:  LOPRESSOR   Stopped by:  Nnamdi Hilario MD           NO ACTIVE MEDICATIONS   Stopped by:  Nnamdi Hilario MD                Where to get your medicines      These medications were sent to [facility] [address] [place] [id number]     Phone:  [phone]     lisinopril 10 MG tablet                Primary Care Provider    None Doctor, MD       No address on file        Thank you!     Thank you for choosing [facility]  for your care. Our goal is always to provide you with excellent care. Hearing back from our patients is one way we can continue to improve our services. Please take a few minutes to complete the written survey that you may receive in the mail after your visit with us. Thank you!             Your Updated Medication List - Protect others around you: Learn how to safely use, store and throw away your medicines at www.disposemymeds.org.          This list is accurate as of: 5/9/17 11:20 AM.  Always use your most recent med list.                   Brand Name Dispense Instructions for use    lisinopril 10 MG tablet    PRINIVIL/ZESTRIL    90 tablet    Take 1 tablet (10 mg)  by mouth daily       NITROSTAT 0.4 MG sublingual tablet   Generic drug:  nitroglycerin      Place 0.4 mg under the tongue every 5 minutes as needed for chest pain Reported on 5/9/2017

## 2017-05-09 NOTE — NURSING NOTE
Chief Complaint   Patient presents with     LAB REQUEST       Initial BP (!) 160/108 (Cuff Size: Adult Regular)  Pulse 78  Temp 97.6  F (36.4  C) (Tympanic)  Resp 18  Ht 6' (1.829 m)  Wt 217 lb (98.4 kg)  BMI 29.43 kg/m2 Estimated body mass index is 29.43 kg/(m^2) as calculated from the following:    Height as of this encounter: 6' (1.829 m).    Weight as of this encounter: 217 lb (98.4 kg).  Medication Reconciliation: complete

## 2017-05-12 DIAGNOSIS — R53.83 FATIGUE, UNSPECIFIED TYPE: ICD-10-CM

## 2017-05-12 DIAGNOSIS — I10 BENIGN ESSENTIAL HYPERTENSION: ICD-10-CM

## 2017-05-12 LAB
ALBUMIN SERPL-MCNC: 4 G/DL (ref 3.4–5)
ALP SERPL-CCNC: 107 U/L (ref 40–150)
ALT SERPL W P-5'-P-CCNC: 33 U/L (ref 0–70)
ANION GAP SERPL CALCULATED.3IONS-SCNC: 10 MMOL/L (ref 3–14)
AST SERPL W P-5'-P-CCNC: 20 U/L (ref 0–45)
BILIRUB SERPL-MCNC: 0.5 MG/DL (ref 0.2–1.3)
BUN SERPL-MCNC: 20 MG/DL (ref 7–30)
CALCIUM SERPL-MCNC: 9 MG/DL (ref 8.5–10.1)
CHLORIDE SERPL-SCNC: 103 MMOL/L (ref 94–109)
CHOLEST SERPL-MCNC: 239 MG/DL
CO2 SERPL-SCNC: 23 MMOL/L (ref 20–32)
CREAT SERPL-MCNC: 1.09 MG/DL (ref 0.66–1.25)
ERYTHROCYTE [DISTWIDTH] IN BLOOD BY AUTOMATED COUNT: 13.8 % (ref 10–15)
GFR SERPL CREATININE-BSD FRML MDRD: 70 ML/MIN/1.7M2
GLUCOSE SERPL-MCNC: 103 MG/DL (ref 70–99)
HCT VFR BLD AUTO: 52.2 % (ref 40–53)
HDLC SERPL-MCNC: 43 MG/DL
HGB BLD-MCNC: 18.6 G/DL (ref 13.3–17.7)
LDLC SERPL CALC-MCNC: 138 MG/DL
MCH RBC QN AUTO: 31.3 PG (ref 26.5–33)
MCHC RBC AUTO-ENTMCNC: 35.6 G/DL (ref 31.5–36.5)
MCV RBC AUTO: 88 FL (ref 78–100)
NONHDLC SERPL-MCNC: 196 MG/DL
PLATELET # BLD AUTO: 247 10E9/L (ref 150–450)
POTASSIUM SERPL-SCNC: 4.1 MMOL/L (ref 3.4–5.3)
PROT SERPL-MCNC: 7.7 G/DL (ref 6.8–8.8)
RBC # BLD AUTO: 5.94 10E12/L (ref 4.4–5.9)
SODIUM SERPL-SCNC: 136 MMOL/L (ref 133–144)
TRIGL SERPL-MCNC: 289 MG/DL
TSH SERPL DL<=0.05 MIU/L-ACNC: 2.03 MU/L (ref 0.4–4)
WBC # BLD AUTO: 6.3 10E9/L (ref 4–11)

## 2017-05-12 PROCEDURE — 84270 ASSAY OF SEX HORMONE GLOBUL: CPT | Performed by: FAMILY MEDICINE

## 2017-05-12 PROCEDURE — 36415 COLL VENOUS BLD VENIPUNCTURE: CPT | Performed by: FAMILY MEDICINE

## 2017-05-12 PROCEDURE — 84443 ASSAY THYROID STIM HORMONE: CPT | Performed by: FAMILY MEDICINE

## 2017-05-12 PROCEDURE — 84403 ASSAY OF TOTAL TESTOSTERONE: CPT | Performed by: FAMILY MEDICINE

## 2017-05-12 PROCEDURE — 80053 COMPREHEN METABOLIC PANEL: CPT | Performed by: FAMILY MEDICINE

## 2017-05-12 PROCEDURE — 85027 COMPLETE CBC AUTOMATED: CPT | Performed by: FAMILY MEDICINE

## 2017-05-12 PROCEDURE — 80061 LIPID PANEL: CPT | Performed by: FAMILY MEDICINE

## 2017-05-13 DIAGNOSIS — E78.2 MIXED HYPERLIPIDEMIA: ICD-10-CM

## 2017-05-13 DIAGNOSIS — I10 ESSENTIAL HYPERTENSION, BENIGN: Primary | ICD-10-CM

## 2017-05-13 RX ORDER — ATORVASTATIN CALCIUM 10 MG/1
10 TABLET, FILM COATED ORAL AT BEDTIME
Qty: 90 TABLET | Refills: 1 | Status: SHIPPED | OUTPATIENT
Start: 2017-05-13

## 2017-05-16 LAB
SHBG SERPL-SCNC: 76 NMOL/L (ref 11–80)
TESTOST FREE SERPL-MCNC: 13.92 NG/DL (ref 4.7–24.4)
TESTOST SERPL-MCNC: 1041 NG/DL (ref 240–950)

## 2017-10-18 ENCOUNTER — ALLIED HEALTH/NURSE VISIT (OUTPATIENT)
Dept: FAMILY MEDICINE | Facility: CLINIC | Age: 57
End: 2017-10-18
Payer: COMMERCIAL

## 2017-10-18 DIAGNOSIS — Z23 NEED FOR PROPHYLACTIC VACCINATION AND INOCULATION AGAINST INFLUENZA: Primary | ICD-10-CM

## 2017-10-18 PROCEDURE — 90471 IMMUNIZATION ADMIN: CPT

## 2017-10-18 PROCEDURE — 90686 IIV4 VACC NO PRSV 0.5 ML IM: CPT

## 2017-10-18 PROCEDURE — 99207 ZZC NO CHARGE NURSE ONLY: CPT

## 2017-10-18 NOTE — PROGRESS NOTES
Injectable Influenza Immunization Documentation    1.  Is the person to be vaccinated sick today?   No    2. Does the person to be vaccinated have an allergy to a component   of the vaccine?   No    3. Has the person to be vaccinated ever had a serious reaction   to influenza vaccine in the past?   No    4. Has the person to be vaccinated ever had Guillain-Barré syndrome?   No    Form completed by Heaven Hedrick MA

## 2017-10-18 NOTE — MR AVS SNAPSHOT
"              After Visit Summary   10/18/2017    Jed Jiménez    MRN: [id number]           Patient Information     Date Of Birth          1960        Visit Information        Provider Department      10/18/2017 9:45 AM [place] PI PHOEBE/LPN [facility]        Today's Diagnoses     Need for prophylactic vaccination and inoculation against influenza    -  1       Follow-ups after your visit        Your next 10 appointments already scheduled     Oct 18, 2017  9:45 AM CDT   Nurse Only with [place] PI PHOEBE/LPN   [facility]    [address] [id number] [phone]              Who to contact     If you have questions or need follow up information about today's clinic visit or your schedule please contact [facility] directly at [phone].  Normal or non-critical lab and imaging results will be communicated to you by MyChart, letter or phone within 4 business days after the clinic has received the results. If you do not hear from us within 7 days, please contact the clinic through MyChart or phone. If you have a critical or abnormal lab result, we will notify you by phone as soon as possible.  Submit refill requests through [employer] or call your pharmacy and they will forward the refill request to us. Please allow 3 business days for your refill to be completed.          Additional Information About Your Visit        MyChart Information     [employer] lets you send messages to your doctor, view your test results, renew your prescriptions, schedule appointments and more. To sign up, go to [email or web address] . Click on \"Log in\" on the left side of the screen, which will take you to the Welcome page. Then click on \"Sign up Now\" on the right side of the page.     You will be asked to enter the access code listed below, as well as some personal information. Please follow the directions to create your username and password.     Your " access code is: [email or web address]  Expires: 2018  9:36 AM     Your access code will  in 90 days. If you need help or a new code, please call your [place] clinic or [phone].        Care EveryWhere ID     This is your Care EveryWhere ID. This could be used by other organizations to access your [place] medical records  [id number]         Blood Pressure from Last 3 Encounters:   17 (!) 160/108   01/10/15 132/80   14 (!) 178/100    Weight from Last 3 Encounters:   17 217 lb (98.4 kg)   01/10/15 208 lb (94.3 kg)   14 217 lb 3.2 oz (98.5 kg)              We Performed the Following     FLU VAC, SPLIT VIRUS IM > 3 YO (QUADRIVALENT) [56223]        Primary Care Provider    None Specified       No primary provider on file.        Equal Access to Services     BUSHRA [facility]RI[facility]NA : Hadii lisa ku hadasho Sobrian, waaxda luqadaha, qaybta kaalmada adegisellayada, maria luisa solares . So [facility] [phone].    ATENCIÓN: Si habla español, tiene a mak disposición servicios gratuitos de asistencia lingüística. Ronald al [phone].    We comply with applicable federal civil rights laws and [place] laws. We do not discriminate on the basis of race, color, national origin, age, disability, sex, sexual orientation, or gender identity.            Thank you!     Thank you for choosing [facility]  for your care. Our goal is always to provide you with excellent care. Hearing back from our patients is one way we can continue to improve our services. Please take a few minutes to complete the written survey that you may receive in the mail after your visit with us. Thank you!             Your Updated Medication List - Protect others around you: Learn how to safely use, store and throw away your medicines at www.disposemymeds.org.          This list is accurate as of: 10/18/17  9:36 AM.  Always use your most recent med list.                   Brand Name Dispense Instructions for use  Diagnosis    aspirin 81 MG tablet     90 tablet    Take 1 tablet (81 mg) by mouth daily    Essential hypertension, benign       atorvastatin 10 MG tablet    LIPITOR    90 tablet    Take 1 tablet (10 mg) by mouth At Bedtime    Mixed hyperlipidemia       lisinopril 10 MG tablet    PRINIVIL/ZESTRIL    90 tablet    Take 1 tablet (10 mg) by mouth daily    Benign essential hypertension       NITROSTAT 0.4 MG sublingual tablet   Generic drug:  nitroGLYcerin      Place 0.4 mg under the tongue every 5 minutes as needed for chest pain Reported on 5/9/2017

## 2018-07-23 ENCOUNTER — OFFICE VISIT (OUTPATIENT)
Dept: FAMILY MEDICINE | Facility: CLINIC | Age: 58
End: 2018-07-23
Payer: COMMERCIAL

## 2018-07-23 ENCOUNTER — TELEPHONE (OUTPATIENT)
Dept: FAMILY MEDICINE | Facility: CLINIC | Age: 58
End: 2018-07-23

## 2018-07-23 ENCOUNTER — ALLIED HEALTH/NURSE VISIT (OUTPATIENT)
Dept: FAMILY MEDICINE | Facility: CLINIC | Age: 58
End: 2018-07-23
Payer: COMMERCIAL

## 2018-07-23 VITALS
DIASTOLIC BLOOD PRESSURE: 110 MMHG | SYSTOLIC BLOOD PRESSURE: 170 MMHG | RESPIRATION RATE: 18 BRPM | TEMPERATURE: 97.8 F | HEART RATE: 72 BPM | OXYGEN SATURATION: 98 %

## 2018-07-23 DIAGNOSIS — G45.1 HEMISPHERIC CAROTID ARTERY SYNDROME: Primary | ICD-10-CM

## 2018-07-23 DIAGNOSIS — Z86.73 H/O: STROKE: ICD-10-CM

## 2018-07-23 DIAGNOSIS — I25.2 H/O NON-ST ELEVATION MYOCARDIAL INFARCTION (NSTEMI): ICD-10-CM

## 2018-07-23 DIAGNOSIS — R53.1 WEAKNESS OF RIGHT SIDE OF BODY: Primary | ICD-10-CM

## 2018-07-23 DIAGNOSIS — I25.9 IHD (ISCHEMIC HEART DISEASE): ICD-10-CM

## 2018-07-23 LAB — GLUCOSE BLD-MCNC: 81 MG/DL (ref 70–99)

## 2018-07-23 PROCEDURE — 36415 COLL VENOUS BLD VENIPUNCTURE: CPT | Performed by: FAMILY MEDICINE

## 2018-07-23 PROCEDURE — 93000 ELECTROCARDIOGRAM COMPLETE: CPT

## 2018-07-23 PROCEDURE — 82947 ASSAY GLUCOSE BLOOD QUANT: CPT | Performed by: FAMILY MEDICINE

## 2018-07-23 PROCEDURE — 99215 OFFICE O/P EST HI 40 MIN: CPT | Performed by: FAMILY MEDICINE

## 2018-07-23 NOTE — MR AVS SNAPSHOT
After Visit Summary   7/23/2018    Jed Jiménez    MRN: [id number]           Patient Information     Date Of Birth          1960        Visit Information        Provider Department      7/23/2018 8:40 AM Nnamdi Hilario MD [facility]        Today's Diagnoses     Hemispheric carotid artery syndrome    -  1    H/O: stroke        IHD (ischemic heart disease)        H/O non-ST elevation myocardial infarction (NSTEMI)           Follow-ups after your visit        Who to contact     If you have questions or need follow up information about today's clinic visit or your schedule please contact [facility] directly at [phone].  Normal or non-critical lab and imaging results will be communicated to you by MyChart, letter or phone within 4 business days after the clinic has received the results. If you do not hear from us within 7 days, please contact the clinic through MyChart or phone. If you have a critical or abnormal lab result, we will notify you by phone as soon as possible.  Submit refill requests through [email or web address] or call your pharmacy and they will forward the refill request to us. Please allow 3 business days for your refill to be completed.          Additional Information About Your Visit        Care EveryWhere ID     This is your Care EveryWhere ID. This could be used by other organizations to access your [place] medical records  [id number]        Your Vitals Were     Pulse Temperature Respirations Pulse Oximetry          72 97.8  F (36.6  C) (Tympanic) 18 98%         Blood Pressure from Last 3 Encounters:   07/23/18 (!) 170/110   07/23/18 (!) 170/110   05/09/17 (!) 160/108    Weight from Last 3 Encounters:   05/09/17 217 lb (98.4 kg)   01/10/15 208 lb (94.3 kg)   11/11/14 217 lb 3.2 oz (98.5 kg)              Today, you had the following     No orders found for display       Primary Care Provider Office Phone # Fax #    Nnamdi Hilario -[phone]  Kera Covarrubias a 11 year old female  presents today with right ear pain onset 1 1/2 weeks ago. She also complains of nasal lcongestion. Was diagnosed with swimmer's ear but then ear drum ruptured. Was seen again and place on cefdinir. Ear still draining. Has been using ear drops.    See nurse's note. Medications and allergies reviewed.    GENERAL:  alert, active, comfortable, not toxic, and in no acute distress  EARS:  Left TM discharge in ear obscures TM and Right TM normal  NOSE:  nasal congestion and clear rhinorrhea  THROAT:  no significant tonsillar hypertrophy or inflam., or oropharyngeal lesions  NECK:  supple and no cervical or supraclavicular lymphadenopathy  LUNGS:  Chest totally clear; no rales, rhonchi, wheezes or stridor, Excellent air exchange with normal I/E; and no tachyp or retractions    A/P:  See Diagnosis, Orders/Medication, and Follow-up instructions. Fluid in ear may just be drops from yesterday. She has no pain with traction of ear or when drops placed in canal though had it initially so likely the drum has healed. Will have her wait for 1 week before going under water. Will give prescription for claritin and she will add pseudoephedrine as needed.     [phone]       [address] [facility] [address]        Equal Access to Services     JAKEBUSHRA ADRIANNA : Hadii aad ku hadginotyson Soana lauraali, waaxda luqadaha, qaybta kaalmada laigregoriotim, maria luisa child rakeshriley schreiberpatriziadiana [place]. So [facility] [phone].    ATENCIÓN: Si habla español, tiene a mak disposición servicios gratuitos de asistencia lingüística. Llame al [phone].    We comply with applicable federal civil rights laws and [place] laws. We do not discriminate on the basis of race, color, national origin, age, disability, sex, sexual orientation, or gender identity.            Thank you!     Thank you for choosing [facility]  for your care. Our goal is always to provide you with excellent care. Hearing back from our patients is one way we can continue to improve our services. Please take a few minutes to complete the written survey that you may receive in the mail after your visit with us. Thank you!             Your Updated Medication List - Protect others around you: Learn how to safely use, store and throw away your medicines at www.disposemymeds.org.          This list is accurate as of 7/23/18  1:22 PM.  Always use your most recent med list.                   Brand Name Dispense Instructions for use Diagnosis    aspirin 81 MG tablet     90 tablet    Take 1 tablet (81 mg) by mouth daily    Essential hypertension, benign       atorvastatin 10 MG tablet    LIPITOR    90 tablet    Take 1 tablet (10 mg) by mouth At Bedtime    Mixed hyperlipidemia       lisinopril 10 MG tablet    PRINIVIL/ZESTRIL    90 tablet    Take 1 tablet (10 mg) by mouth daily    Benign essential hypertension       NITROSTAT 0.4 MG sublingual tablet   Generic drug:  nitroGLYcerin      Place 0.4 mg under the tongue every 5 minutes as needed for chest pain Reported on 5/9/2017

## 2018-07-23 NOTE — TELEPHONE ENCOUNTER
Reason for Call:  Other FYI    Detailed comments: Pt walked in to clinic today with stroke symptoms. Girlfriend Preeti called to state she believes he is confused and wanted to let the Dr know pt told her he thought he had a stroke at 6am this morning and that he was having another in route to the clinic. Pt had a heart attack and stroke 6 years ago and another stroke in April.   He smokes over a pack a day of cigarettes.    Phone Number Patient can be reached at: Other phone number:  Preeti - [phone]    Best Time: any    Can we leave a detailed message on this number?     Call taken on 7/23/2018 at 9:14 AM by Brenna Robles

## 2018-07-23 NOTE — MR AVS SNAPSHOT
After Visit Summary   7/23/2018    Jed Jiménez    MRN: [id number]           Patient Information     Date Of Birth          1960        Visit Information        Provider Department      7/23/2018 9:15 AM FL PI RN [facility]        Today's Diagnoses     Weakness of right side of body    -  1       Follow-ups after your visit        Who to contact     If you have questions or need follow up information about today's clinic visit or your schedule please contact [facility] directly at [phone].  Normal or non-critical lab and imaging results will be communicated to you by MyChart, letter or phone within 4 business days after the clinic has received the results. If you do not hear from us within 7 days, please contact the clinic through MyChart or phone. If you have a critical or abnormal lab result, we will notify you by phone as soon as possible.  Submit refill requests through [employer] or call your pharmacy and they will forward the refill request to us. Please allow 3 business days for your refill to be completed.          Additional Information About Your Visit        Care EveryWhere ID     This is your Care EveryWhere ID. This could be used by other organizations to access your [place] medical records  [id number]        Your Vitals Were     Pulse Temperature Respirations Pulse Oximetry          72 97.8  F (36.6  C) (Tympanic) 18 98%         Blood Pressure from Last 3 Encounters:   07/23/18 (!) 170/110   07/23/18 (!) 170/110   05/09/17 (!) 160/108    Weight from Last 3 Encounters:   05/09/17 217 lb (98.4 kg)   01/10/15 208 lb (94.3 kg)   11/11/14 217 lb 3.2 oz (98.5 kg)              We Performed the Following     EKG 12-lead complete w/read - Clinics     Glucose, whole blood        Primary Care Provider Office Phone # Fax #    Nnamdi Arjun Hilario [phone]       [address] [facility] [address]        Equal Access to Services     TYESHA  GAAR : Hadii aad dung sheba Meza, waaxda luqadaha, qaybta kaalmada tiarra, maria luisa danyell rakeshriley schreiberpatriziadiana [place]. So [facility] [phone].    ATENCIÓN: Si habla español, tiene a mak disposición servicios gratuitos de asistencia lingüística. Llame al [phone].    We comply with applicable federal civil rights laws and [place] laws. We do not discriminate on the basis of race, color, national origin, age, disability, sex, sexual orientation, or gender identity.            Thank you!     Thank you for choosing [facility]  for your care. Our goal is always to provide you with excellent care. Hearing back from our patients is one way we can continue to improve our services. Please take a few minutes to complete the written survey that you may receive in the mail after your visit with us. Thank you!             Your Updated Medication List - Protect others around you: Learn how to safely use, store and throw away your medicines at www.disposemymeds.org.          This list is accurate as of 7/23/18 11:59 PM.  Always use your most recent med list.                   Brand Name Dispense Instructions for use Diagnosis    aspirin 81 MG tablet     90 tablet    Take 1 tablet (81 mg) by mouth daily    Essential hypertension, benign       atorvastatin 10 MG tablet    LIPITOR    90 tablet    Take 1 tablet (10 mg) by mouth At Bedtime    Mixed hyperlipidemia       lisinopril 10 MG tablet    PRINIVIL/ZESTRIL    90 tablet    Take 1 tablet (10 mg) by mouth daily    Benign essential hypertension       NITROSTAT 0.4 MG sublingual tablet   Generic drug:  nitroGLYcerin      Place 0.4 mg under the tongue every 5 minutes as needed for chest pain Reported on 5/9/2017

## 2018-07-23 NOTE — PROGRESS NOTES
SUBJECTIVE:   Jed Jiménez is a 57 year old male who presents to clinic today for the following health issues:      Chief Complaint   Patient presents with     Extremity Weakness       Duration: today     Description (location/character/radiation): Complains of right-sided upper and lower extremity weakness along with speech difficulty, experienced one episode this morning (6 am) and then again while driving to [facility], with episode lasted for about a minute or so only    Accompanying signs and symptoms: Denies any visual symptoms, chest pain, palpitation, shortness breath, bowel/bladder or other relevant systemic symptoms    History (similar episodes/previous evaluation): History of MI 6 years ago, stroke 6 years ago and in April this year    Precipitating or alleviating factors: None    Therapies tried and outcome: None         Problem list and histories reviewed & adjusted, as indicated.  Additional history: as documented    Patient Active Problem List   Diagnosis     Benign essential hypertension     Fatigue, unspecified type     Overweight (BMI 25.0-29.9)     Tobacco abuse     Mixed hyperlipidemia     No past surgical history on file.    Social History   Substance Use Topics     Smoking status: Current Every Day Smoker     Packs/day: 1.00     Types: Cigarettes     Smokeless tobacco: Never Used      Comment: 1/2 PPD     Alcohol use Yes     No family history on file.      Current Outpatient Prescriptions   Medication Sig Dispense Refill     aspirin 81 MG tablet Take 1 tablet (81 mg) by mouth daily 90 tablet 3     atorvastatin (LIPITOR) 10 MG tablet Take 1 tablet (10 mg) by mouth At Bedtime 90 tablet 1     lisinopril (PRINIVIL/ZESTRIL) 10 MG tablet Take 1 tablet (10 mg) by mouth daily 90 tablet 0     nitroglycerin (NITROSTAT) 0.4 MG SL tablet Place 0.4 mg under the tongue every 5 minutes as needed for chest pain Reported on 5/9/2017       Allergies   Allergen Reactions     Nkda [No Known Drug Allergies]       Recent Labs   Lab Test  05/12/17   0810   LDL  138*   HDL  43   TRIG  289*   ALT  33   CR  1.09   GFRESTIMATED  70   GFRESTBLACK  85   POTASSIUM  4.1   TSH  2.03      BP Readings from Last 3 Encounters:   07/23/18 (!) 170/110   05/09/17 (!) 160/108   01/10/15 132/80    Wt Readings from Last 3 Encounters:   05/09/17 217 lb (98.4 kg)   01/10/15 208 lb (94.3 kg)   11/11/14 217 lb 3.2 oz (98.5 kg)                  Labs reviewed in EPIC    Reviewed and updated as needed this visit by clinical staff       Reviewed and updated as needed this visit by Provider         ROS:  Constitutional, HEENT, cardiovascular, pulmonary, GI, , musculoskeletal, neuro, skin, endocrine and psych systems are negative, except as otherwise noted.    OBJECTIVE:   BP (!) 170/110 (BP Location: Left arm, Patient Position: Sitting, Cuff Size: Adult Large)  Pulse 72  Temp 97.8  F (36.6  C) (Tympanic)  Resp 18  SpO2 98%  GENERAL: alert, no distress and anxious   EYES: Eyes grossly normal to inspection, PERRL and conjunctivae and sclerae normal  NECK: no adenopathy, no asymmetry, masses, or scars and thyroid normal to palpation  RESP: lungs clear to auscultation - no rales, rhonchi or wheezes  CV: regular rate and rhythm, normal S1 S2, no S3 or S4, no murmur, click or rub, no peripheral edema and peripheral pulses strong  ABDOMEN: soft, nontender, no hepatosplenomegaly, no masses and bowel sounds normal  MS: no gross musculoskeletal defects noted, no edema  SKIN: no suspicious lesions or rashes  NEURO: Normal strength and tone, sensory exam grossly normal, mentation intact, speech normal, cranial nerves 2-12 intact and DTR's normal and symmetric bilaterally, no cerebellar signs    ECG: Sinus rhythm, Q-wave in lead III, V1, V2 and V3  Glucose: 81    ASSESSMENT/PLAN:         ICD-10-CM    1. Hemispheric carotid artery syndrome G45.1    2. H/O: stroke Z86.73    3. IHD (ischemic heart disease) I25.9    4. H/O non-ST elevation myocardial infarction  (NSTEMI) I25.2        57-year-old male presents with sudden onset right-sided upper and lower extremity weakness along with some his speech difficulty, had 2 episodes today, one at 6 AM this morning and then again when he was [place] [facility], each episode lasting for about a minute or so only.  He was history of stroke and MI.  Physical examination unremarkable currently apart from significantly elevated blood pressure.  Suspect symptoms secondary to transient ischemic attack.  ECG findings consistent with old anteroseptal infarct, glucose level normal.  911 was called and and patient transferred to [facility] for further evaluation and management.  Patient understood and in agreement with the above plan.  All questions answered.        Nnamdi Hilario MD  [facility]

## 2018-07-24 NOTE — NURSING NOTE
S-(situation): Walk in- reports stroke like sx this AM. Pt drove self to clinic.     B-(background): Hx CVA and MI 6 yrs ago and CVA April 2018. States takes BP and cholesterol med about 50% of time. Took lisinopril this AM, not ASA.    A-(assessment): Reports brief episode rt sided weakness 6 AM today and again while enroute to ED this AM. States sx similar to past CVA's. Pt turned around to come back to clinic.  Pt alert, oriented. Skin warm/ dry. Speech clear. No obvious deficits. Denies chest pain, pressure, headaches/ pressure, lightheadedness, dizziness.  BP (!) 170/110 (BP Location: Left arm, Patient Position: Sitting, Cuff Size: Adult Large)  Pulse 72  Temp 97.8  F (36.6  C) (Tympanic)  Resp 18  SpO2 98%     R-(recommendations): EKG, BG per VO Dr. Hilario. Further eval by Dr. Hilario- see OV note.   LUCIEN Sr RN

## 2018-08-09 ENCOUNTER — ALLIED HEALTH/NURSE VISIT (OUTPATIENT)
Dept: FAMILY MEDICINE | Facility: CLINIC | Age: 58
End: 2018-08-09
Payer: COMMERCIAL

## 2018-08-09 VITALS — DIASTOLIC BLOOD PRESSURE: 84 MMHG | SYSTOLIC BLOOD PRESSURE: 129 MMHG

## 2018-08-09 DIAGNOSIS — I10 BENIGN ESSENTIAL HYPERTENSION: Primary | ICD-10-CM

## 2018-08-09 PROCEDURE — 99207 ZZC NO CHARGE NURSE ONLY: CPT | Performed by: FAMILY MEDICINE

## 2018-08-09 NOTE — MR AVS SNAPSHOT
After Visit Summary   8/9/2018    Jed Jiménez    MRN: [id number]           Patient Information     Date Of Birth          1960        Visit Information        Provider Department      8/9/2018 3:46 PM Nnamdi Hilario MD [facility]        Today's Diagnoses     Benign essential hypertension    -  1       Follow-ups after your visit        Who to contact     If you have questions or need follow up information about today's clinic visit or your schedule please contact [facility] directly at [phone].  Normal or non-critical lab and imaging results will be communicated to you by MyChart, letter or phone within 4 business days after the clinic has received the results. If you do not hear from us within 7 days, please contact the clinic through MyChart or phone. If you have a critical or abnormal lab result, we will notify you by phone as soon as possible.  Submit refill requests through [employer] or call your pharmacy and they will forward the refill request to us. Please allow 3 business days for your refill to be completed.          Additional Information About Your Visit        Care EveryWhere ID     This is your Care EveryWhere ID. This could be used by other organizations to access your [place] medical records  [id number]         Blood Pressure from Last 3 Encounters:   07/30/18 129/84   07/23/18 (!) 170/110   07/23/18 (!) 170/110    Weight from Last 3 Encounters:   05/09/17 217 lb (98.4 kg)   01/10/15 208 lb (94.3 kg)   11/11/14 217 lb 3.2 oz (98.5 kg)              Today, you had the following     No orders found for display       Primary Care Provider Office Phone # Fax #    Nnamdi Hilario [phone]       [address]  [facility] [address]        Equal Access to Services     TYESHA RODAS AH: Christine Meza, linn phan, maria luisa bobby. So [facility]  [phone].    ATENCIÓN: Si dontae [place]ork, tiene a mak disposición servicios gratuitos de asistencia lingüística. Ronald al [phone].    We comply with applicable federal civil rights laws and [place] laws. We do not discriminate on the basis of race, color, national origin, age, disability, sex, sexual orientation, or gender identity.            Thank you!     Thank you for choosing [facility]  for your care. Our goal is always to provide you with excellent care. Hearing back from our patients is one way we can continue to improve our services. Please take a few minutes to complete the written survey that you may receive in the mail after your visit with us. Thank you!             Your Updated Medication List - Protect others around you: Learn how to safely use, store and throw away your medicines at www.disposemymeds.org.          This list is accurate as of 8/9/18  3:47 PM.  Always use your most recent med list.                   Brand Name Dispense Instructions for use Diagnosis    aspirin 81 MG tablet     90 tablet    Take 1 tablet (81 mg) by mouth daily    Essential hypertension, benign       atorvastatin 10 MG tablet    LIPITOR    90 tablet    Take 1 tablet (10 mg) by mouth At Bedtime    Mixed hyperlipidemia       lisinopril 10 MG tablet    PRINIVIL/ZESTRIL    90 tablet    Take 1 tablet (10 mg) by mouth daily    Benign essential hypertension       NITROSTAT 0.4 MG sublingual tablet   Generic drug:  nitroGLYcerin      Place 0.4 mg under the tongue every 5 minutes as needed for chest pain Reported on 5/9/2017

## 2018-10-15 ENCOUNTER — ALLIED HEALTH/NURSE VISIT (OUTPATIENT)
Dept: FAMILY MEDICINE | Facility: CLINIC | Age: 58
End: 2018-10-15
Payer: COMMERCIAL

## 2018-10-15 DIAGNOSIS — Z23 NEED FOR PROPHYLACTIC VACCINATION AND INOCULATION AGAINST INFLUENZA: Primary | ICD-10-CM

## 2018-10-15 PROCEDURE — 90682 RIV4 VACC RECOMBINANT DNA IM: CPT

## 2018-10-15 PROCEDURE — 99207 ZZC NO CHARGE NURSE ONLY: CPT

## 2018-10-15 PROCEDURE — 90471 IMMUNIZATION ADMIN: CPT

## 2018-10-15 NOTE — MR AVS SNAPSHOT
After Visit Summary   10/15/2018    Jed Jiménez    MRN: [id number]           Patient Information     Date Of Birth          1960        Visit Information        Provider Department      10/15/2018 10:30 AM [place] ASHTYN SANDHU/LPN [facility]        Today's Diagnoses     Need for prophylactic vaccination and inoculation against influenza    -  1       Follow-ups after your visit        Your next 10 appointments already scheduled     Oct 15, 2018 10:30 AM CDT   Nurse Only with [place] ASHTYN SANDHU/LPN   [facility] ([facility]    [address] [id number] [phone]              Who to contact     If you have questions or need follow up information about today's clinic visit or your schedule please contact [facility] directly at [phone].  Normal or non-critical lab and imaging results will be communicated to you by MyChart, letter or phone within 4 business days after the clinic has received the results. If you do not hear from us within 7 days, please contact the clinic through MyChart or phone. If you have a critical or abnormal lab result, we will notify you by phone as soon as possible.  Submit refill requests through [employer] or call your pharmacy and they will forward the refill request to us. Please allow 3 business days for your refill to be completed.          Additional Information About Your Visit        Care EveryWhere ID     This is your Care EveryWhere ID. This could be used by other organizations to access your [place] medical records  [id number]         Blood Pressure from Last 3 Encounters:   07/30/18 129/84   07/23/18 (!) 170/110   07/23/18 (!) 170/110    Weight from Last 3 Encounters:   05/09/17 217 lb (98.4 kg)   01/10/15 208 lb (94.3 kg)   11/11/14 217 lb 3.2 oz (98.5 kg)              We Performed the Following     FLU VACCINE, (RIV4) RECOMBINANT HA  , IM (FluBlok, egg free) [59742]- >18 YRS ([facility] [place]  50-64 YRS)     Vaccine Administration, Initial [85883]        Primary Care Provider Office Phone # Fax #    Nnamdi Díaz MD [facility] [phone]       [address] [facility] [address]        Equal Access to Services     TYESHA RODAS : Hadtena razo hadginoo Soomaali, waaxda luqadaha, qaybta kaalmada adeegyada, maria luisa jadynin hayaan karengisella castañeda sujatha richmond. So [facility] [phone].    ATENCIÓN: Si habla español, tiene a mak disposición servicios gratuitos de asistencia lingüística. Llame al [phone].    We comply with applicable federal civil rights laws and [place] laws. We do not discriminate on the basis of race, color, national origin, age, disability, sex, sexual orientation, or gender identity.            Thank you!     Thank you for choosing [facility]  for your care. Our goal is always to provide you with excellent care. Hearing back from our patients is one way we can continue to improve our services. Please take a few minutes to complete the written survey that you may receive in the mail after your visit with us. Thank you!             Your Updated Medication List - Protect others around you: Learn how to safely use, store and throw away your medicines at www.disposemymeds.org.          This list is accurate as of 10/15/18 10:01 AM.  Always use your most recent med list.                   Brand Name Dispense Instructions for use Diagnosis    aspirin 81 MG tablet     90 tablet    Take 1 tablet (81 mg) by mouth daily    Essential hypertension, benign       atorvastatin 10 MG tablet    LIPITOR    90 tablet    Take 1 tablet (10 mg) by mouth At Bedtime    Mixed hyperlipidemia       lisinopril 10 MG tablet    PRINIVIL/ZESTRIL    90 tablet    Take 1 tablet (10 mg) by mouth daily    Benign essential hypertension       NITROSTAT 0.4 MG sublingual tablet   Generic drug:  nitroGLYcerin      Place 0.4 mg under the tongue every 5 minutes as needed for chest pain Reported on  5/9/2017

## 2019-06-28 VITALS
HEIGHT: 72 IN | OXYGEN SATURATION: 98 % | HEART RATE: 72 BPM | BODY MASS INDEX: 29.43 KG/M2 | RESPIRATION RATE: 18 BRPM | DIASTOLIC BLOOD PRESSURE: 110 MMHG | TEMPERATURE: 97.8 F | SYSTOLIC BLOOD PRESSURE: 170 MMHG

## 2019-09-16 VITALS — HEIGHT: 72 IN | WEIGHT: 208 LBS | BODY MASS INDEX: 28.17 KG/M2

## 2019-09-16 ASSESSMENT — MIFFLIN-ST. JEOR: SCORE: 1801.48

## 2019-09-27 ENCOUNTER — MEDICAL CORRESPONDENCE (OUTPATIENT)
Dept: HEALTH INFORMATION MANAGEMENT | Facility: CLINIC | Age: 59
End: 2019-09-27

## 2020-09-22 DIAGNOSIS — I10 ESSENTIAL HYPERTENSION: Primary | ICD-10-CM

## 2020-09-22 DIAGNOSIS — E78.5 HYPERLIPIDEMIA, UNSPECIFIED HYPERLIPIDEMIA TYPE: ICD-10-CM

## 2020-09-25 ENCOUNTER — ALLIED HEALTH/NURSE VISIT (OUTPATIENT)
Dept: FAMILY MEDICINE | Facility: CLINIC | Age: 60
End: 2020-09-25
Payer: COMMERCIAL

## 2020-09-25 DIAGNOSIS — Z23 NEED FOR PROPHYLACTIC VACCINATION AND INOCULATION AGAINST INFLUENZA: Primary | ICD-10-CM

## 2020-09-25 DIAGNOSIS — I10 ESSENTIAL HYPERTENSION: ICD-10-CM

## 2020-09-25 DIAGNOSIS — E78.5 HYPERLIPIDEMIA, UNSPECIFIED HYPERLIPIDEMIA TYPE: ICD-10-CM

## 2020-09-25 LAB
ALT SERPL W P-5'-P-CCNC: 34 U/L (ref 0–70)
ANION GAP SERPL CALCULATED.3IONS-SCNC: 4 MMOL/L (ref 3–14)
BUN SERPL-MCNC: 15 MG/DL (ref 7–30)
CALCIUM SERPL-MCNC: 9.2 MG/DL (ref 8.5–10.1)
CHLORIDE SERPL-SCNC: 110 MMOL/L (ref 94–109)
CO2 SERPL-SCNC: 26 MMOL/L (ref 20–32)
CREAT SERPL-MCNC: 1.11 MG/DL (ref 0.66–1.25)
GFR SERPL CREATININE-BSD FRML MDRD: 72 ML/MIN/{1.73_M2}
GLUCOSE SERPL-MCNC: 119 MG/DL (ref 70–99)
POTASSIUM SERPL-SCNC: 3.9 MMOL/L (ref 3.4–5.3)
SODIUM SERPL-SCNC: 140 MMOL/L (ref 133–144)

## 2020-09-25 PROCEDURE — 84460 ALANINE AMINO (ALT) (SGPT): CPT | Performed by: INTERNAL MEDICINE

## 2020-09-25 PROCEDURE — 99207 ZZC NO CHARGE NURSE ONLY: CPT

## 2020-09-25 PROCEDURE — 90682 RIV4 VACC RECOMBINANT DNA IM: CPT

## 2020-09-25 PROCEDURE — 36415 COLL VENOUS BLD VENIPUNCTURE: CPT | Performed by: INTERNAL MEDICINE

## 2020-09-25 PROCEDURE — 90471 IMMUNIZATION ADMIN: CPT

## 2020-09-25 PROCEDURE — 80048 BASIC METABOLIC PNL TOTAL CA: CPT | Performed by: INTERNAL MEDICINE

## 2020-10-14 DIAGNOSIS — E78.5 HYPERLIPIDEMIA, UNSPECIFIED HYPERLIPIDEMIA TYPE: ICD-10-CM

## 2020-10-14 LAB
CHOLEST SERPL-MCNC: 131 MG/DL
HDLC SERPL-MCNC: 51 MG/DL
LDLC SERPL CALC-MCNC: 53 MG/DL
NONHDLC SERPL-MCNC: 80 MG/DL
TRIGL SERPL-MCNC: 135 MG/DL

## 2020-10-14 PROCEDURE — 80061 LIPID PANEL: CPT | Performed by: INTERNAL MEDICINE

## 2020-10-14 PROCEDURE — 36415 COLL VENOUS BLD VENIPUNCTURE: CPT | Performed by: INTERNAL MEDICINE
